# Patient Record
Sex: MALE | Race: BLACK OR AFRICAN AMERICAN | NOT HISPANIC OR LATINO
[De-identification: names, ages, dates, MRNs, and addresses within clinical notes are randomized per-mention and may not be internally consistent; named-entity substitution may affect disease eponyms.]

---

## 2018-02-18 PROBLEM — Z00.00 ENCOUNTER FOR PREVENTIVE HEALTH EXAMINATION: Status: ACTIVE | Noted: 2018-02-18

## 2018-03-19 ENCOUNTER — APPOINTMENT (OUTPATIENT)
Dept: CHRONIC DISEASE MANAGEMENT | Facility: CLINIC | Age: 63
End: 2018-03-19
Payer: COMMERCIAL

## 2018-03-19 VITALS — BODY MASS INDEX: 26.28 KG/M2 | HEIGHT: 72 IN | WEIGHT: 194 LBS

## 2018-03-19 PROCEDURE — 96150: CPT

## 2018-05-19 ENCOUNTER — EMERGENCY (EMERGENCY)
Facility: HOSPITAL | Age: 63
LOS: 1 days | Discharge: HOME | End: 2018-05-19
Attending: EMERGENCY MEDICINE | Admitting: EMERGENCY MEDICINE
Payer: COMMERCIAL

## 2018-05-19 VITALS
DIASTOLIC BLOOD PRESSURE: 85 MMHG | SYSTOLIC BLOOD PRESSURE: 146 MMHG | RESPIRATION RATE: 16 BRPM | HEART RATE: 58 BPM | OXYGEN SATURATION: 100 %

## 2018-05-19 LAB
ALBUMIN SERPL ELPH-MCNC: 4.4 G/DL — SIGNIFICANT CHANGE UP (ref 3.3–5)
ALP SERPL-CCNC: 96 U/L — SIGNIFICANT CHANGE UP (ref 40–120)
ALT FLD-CCNC: 23 U/L — SIGNIFICANT CHANGE UP (ref 4–41)
AST SERPL-CCNC: 22 U/L — SIGNIFICANT CHANGE UP (ref 4–40)
BASOPHILS # BLD AUTO: 0.01 K/UL — SIGNIFICANT CHANGE UP (ref 0–0.2)
BASOPHILS NFR BLD AUTO: 0.2 % — SIGNIFICANT CHANGE UP (ref 0–2)
BILIRUB SERPL-MCNC: 0.4 MG/DL — SIGNIFICANT CHANGE UP (ref 0.2–1.2)
BUN SERPL-MCNC: 17 MG/DL — SIGNIFICANT CHANGE UP (ref 7–23)
CALCIUM SERPL-MCNC: 9.7 MG/DL — SIGNIFICANT CHANGE UP (ref 8.4–10.5)
CHLORIDE SERPL-SCNC: 102 MMOL/L — SIGNIFICANT CHANGE UP (ref 98–107)
CO2 SERPL-SCNC: 28 MMOL/L — SIGNIFICANT CHANGE UP (ref 22–31)
CREAT SERPL-MCNC: 0.93 MG/DL — SIGNIFICANT CHANGE UP (ref 0.5–1.3)
EOSINOPHIL # BLD AUTO: 0.09 K/UL — SIGNIFICANT CHANGE UP (ref 0–0.5)
EOSINOPHIL NFR BLD AUTO: 2.1 % — SIGNIFICANT CHANGE UP (ref 0–6)
GLUCOSE SERPL-MCNC: 156 MG/DL — HIGH (ref 70–99)
HCT VFR BLD CALC: 38.2 % — LOW (ref 39–50)
HGB BLD-MCNC: 13.4 G/DL — SIGNIFICANT CHANGE UP (ref 13–17)
IMM GRANULOCYTES # BLD AUTO: 0.01 # — SIGNIFICANT CHANGE UP
IMM GRANULOCYTES NFR BLD AUTO: 0.2 % — SIGNIFICANT CHANGE UP (ref 0–1.5)
LYMPHOCYTES # BLD AUTO: 1.76 K/UL — SIGNIFICANT CHANGE UP (ref 1–3.3)
LYMPHOCYTES # BLD AUTO: 41.9 % — SIGNIFICANT CHANGE UP (ref 13–44)
MCHC RBC-ENTMCNC: 31.9 PG — SIGNIFICANT CHANGE UP (ref 27–34)
MCHC RBC-ENTMCNC: 35.1 % — SIGNIFICANT CHANGE UP (ref 32–36)
MCV RBC AUTO: 91 FL — SIGNIFICANT CHANGE UP (ref 80–100)
MONOCYTES # BLD AUTO: 0.31 K/UL — SIGNIFICANT CHANGE UP (ref 0–0.9)
MONOCYTES NFR BLD AUTO: 7.4 % — SIGNIFICANT CHANGE UP (ref 2–14)
NEUTROPHILS # BLD AUTO: 2.02 K/UL — SIGNIFICANT CHANGE UP (ref 1.8–7.4)
NEUTROPHILS NFR BLD AUTO: 48.2 % — SIGNIFICANT CHANGE UP (ref 43–77)
NRBC # FLD: 0 — SIGNIFICANT CHANGE UP
PLATELET # BLD AUTO: 152 K/UL — SIGNIFICANT CHANGE UP (ref 150–400)
PMV BLD: 11.1 FL — SIGNIFICANT CHANGE UP (ref 7–13)
POTASSIUM SERPL-MCNC: 4.5 MMOL/L — SIGNIFICANT CHANGE UP (ref 3.5–5.3)
POTASSIUM SERPL-SCNC: 4.5 MMOL/L — SIGNIFICANT CHANGE UP (ref 3.5–5.3)
PROT SERPL-MCNC: 7.2 G/DL — SIGNIFICANT CHANGE UP (ref 6–8.3)
RBC # BLD: 4.2 M/UL — SIGNIFICANT CHANGE UP (ref 4.2–5.8)
RBC # FLD: 13.1 % — SIGNIFICANT CHANGE UP (ref 10.3–14.5)
SODIUM SERPL-SCNC: 140 MMOL/L — SIGNIFICANT CHANGE UP (ref 135–145)
TROPONIN T SERPL-MCNC: < 0.06 NG/ML — SIGNIFICANT CHANGE UP (ref 0–0.06)
WBC # BLD: 4.2 K/UL — SIGNIFICANT CHANGE UP (ref 3.8–10.5)
WBC # FLD AUTO: 4.2 K/UL — SIGNIFICANT CHANGE UP (ref 3.8–10.5)

## 2018-05-19 PROCEDURE — 99285 EMERGENCY DEPT VISIT HI MDM: CPT | Mod: 25

## 2018-05-19 PROCEDURE — 70450 CT HEAD/BRAIN W/O DYE: CPT | Mod: 26

## 2018-05-19 RX ORDER — MECLIZINE HCL 12.5 MG
1 TABLET ORAL
Qty: 14 | Refills: 0 | OUTPATIENT
Start: 2018-05-19 | End: 2018-05-25

## 2018-05-19 RX ORDER — MECLIZINE HCL 12.5 MG
25 TABLET ORAL ONCE
Qty: 0 | Refills: 0 | Status: COMPLETED | OUTPATIENT
Start: 2018-05-19 | End: 2018-05-19

## 2018-05-19 RX ADMIN — Medication 25 MILLIGRAM(S): at 07:50

## 2018-05-19 NOTE — ED ADULT NURSE NOTE - OBJECTIVE STATEMENT
64 yo F received in spot B.  Pt is 64 yo M A&Ox4 co dizziness.  Pt rpts. the room is spinning. Pt. denies any hx. of Vertigo.  Rpts he took his 1st metformin yesterday for the first time.  Pt NAD.  18R ac

## 2018-05-19 NOTE — CONSULT NOTE ADULT - ASSESSMENT
Pt is a 62 yo M with a PMH of HTN and DM who is presenting with new onset room spinning sensation on getting out of bed this AM. By description dizziness is vertiginous and peripheral. On exam there is torsional nystagmus to right head turn on [place]-Hallpike as well as quick saccades on head thrust. Symptoms slightly improved with Epley. Head CT (prelim as per me) shows no acute pathology. Likely dizziness 2/2 Benign Paroxysmal Positional Vertigo.    Recommendations:  - c/w Meclizine 25mg po q8hr standing for 3-4 days  - If official CT head shows no acute pathology can f/u ENT and Neuro as outpatient  - Will need ENT to specify canal of cause and direct vestibular therapy  - Can call [phone] for Neuro appointment with Dr. Schoenberg

## 2018-05-19 NOTE — ED PROVIDER NOTE - ATTENDING CONTRIBUTION TO CARE
Toritolianetwitelma: 64 yo male with a h/o DM c/o room spinning dizziness that began yesterday. + associated chest pain which is chronic and has been worked up as an outpatient. NO SOB, headache or visual changes. Dizziness is worse with positional changes. NO nausea or vomiting. Exam: well appearing, NAD, MMM, +S1/S2, no murmurs, + bradycardia, lungs CTA b/l, abdomen is soft and tender, no LE edema or calf TTP. 2+ distal pulses x 4 extremities. CNI-XII intact, no dysmetria, disdiadochokinesia, or ataxia. AxOx4. A/P- 64 yo male with first episode of vertigo like symptoms will obtain cbc, cmp, troponin, CT head, treat symptomatically and consult neuro.

## 2018-05-19 NOTE — ED PROVIDER NOTE - CARE PLAN
Principal Discharge DX:	Vertigo  Assessment and plan of treatment:	1) Please follow-up with an ENT doctor (referral list attached) within the next week if your symptoms persist.  If you cannot follow-up, please return to the ED for any urgent issues.  2) If you have any worsening of symptoms, including intractable nausea or vomiting, worsening dizziness, inability to walk, or any other concerns please return to the ED immediately.  3) Please continue taking your home medications as directed.  4) You may have been given a copy of your labs and/or imaging.  Please go over these with your primary care doctor.  5) A prescription has been sent to your pharmacy. Please take it as directed.

## 2018-05-19 NOTE — ED PROVIDER NOTE - PLAN OF CARE
1) Please follow-up with an ENT doctor (referral list attached) within the next week if your symptoms persist.  If you cannot follow-up, please return to the ED for any urgent issues.  2) If you have any worsening of symptoms, including intractable nausea or vomiting, worsening dizziness, inability to walk, or any other concerns please return to the ED immediately.  3) Please continue taking your home medications as directed.  4) You may have been given a copy of your labs and/or imaging.  Please go over these with your primary care doctor.  5) A prescription has been sent to your pharmacy. Please take it as directed.

## 2018-05-19 NOTE — ED PROVIDER NOTE - PROGRESS NOTE DETAILS
Nito: Pt feeling much better after meclizine. troponin still pending. Sanchez: Patient reports feeling much improved, asymptomatic at this time. Pt seen and eval by neuro.  Test results and dispo plan reviewed.

## 2018-05-19 NOTE — ED PROVIDER NOTE - MEDICAL DECISION MAKING DETAILS
63M pmhx dm, htn, c/o one episode room-spinning dizziness w/ preceding cp. PE WNL, will r/o cardiac + obtain neuro consult.

## 2018-05-19 NOTE — CONSULT NOTE ADULT - SUBJECTIVE AND OBJECTIVE BOX
HPI:  Pt is a 64 yo M with a PMH of HTN and DM who is presenting with new onset room spinning sensation on getting out of bed this AM. Pt notes that it is worse on turning his head to the right, lasts seconds to a minute then resolves, but returns on moving his head again. Improved by closing his eyes, denies any prior events like this. Pt does also note some CP and SOB he has been having for a couple of months that is being worked up his cardiologist. Otherwise pt denies any recent fevers, chills, HA, weakness, numbness, tingling, vision changes, hearing changes, cough, nausea, vomiting, abdominal pain, changes in BMs/urination.      MEDICATIONS  (STANDING):    MEDICATIONS  (PRN):    PAST MEDICAL & SURGICAL HISTORY:    FAMILY HISTORY:    Allergies    No Known Allergies    Intolerances    SHx - No smoking, No ETOH, No drug abuse    Review of Systems:  See HPI.    Vital Signs Last 24 Hrs  T(C): --  T(F): --  HR: 58 (19 May 2018 05:33) (58 - 58)  BP: 146/85 (19 May 2018 05:33) (146/85 - 146/85)  BP(mean): --  RR: 16 (19 May 2018 05:33) (16 - 16)  SpO2: 100% (19 May 2018 05:33) (100% - 100%)      General Exam:   General appearance: No acute distress    Neurological Exam:  Mental Status: Orientated to self, date and place.  Attention intact.  No dysarthria. Speech fluent.  Cranial Nerves: PERRL, EOMI, VFF, [place]-Hallpike with torsional nystagmus to right head turn, quick saccades on head thrust. CN V1-3 intact to light touch b/l.  No facial asymmetry.  Hearing intact to finger rub bilaterally.  Tongue, uvula and palate midline.  Sternocleidomastoid and Trapezius intact bilaterally.    Motor:   Tone: normal.                  Strength:     [] Upper extremity                      Delt       Bicep    Tricep                                                  R         5/5        5/5        5/5       5/5                                               L          5/5        5/5        5/5       5/5  [] Lower extremity                       HF          KE          KF        DF         PF                                               R        5/5        5/5        5/5       5/5       5/5                                               L         5/5        5/5       5/5       5/5        5/5  Pronator drift: none b/l  Dysmetria: None to finger-nose-fingerb/l  No truncal ataxia.    Tremor: No resting, postural or action tremor.  No myoclonus.    Sensation: intact to light touch throughout, no extinction    Deep Tendon Reflexes:              Biceps     BR        Patellar        Ankle       Babinski                                         R       1+          1+        1+               0             downgoing                                         L        1+          1+        1+               0             downgoing    Gait: normal in all modalities      05-19    140  |  102  |  17  ----------------------------<  156<H>  4.5   |  28  |  0.93    Ca    9.7      19 May 2018 07:09    TPro  7.2  /  Alb  4.4  /  TBili  0.4  /  DBili  x   /  AST  22  /  ALT  23  /  AlkPhos  96  05-19                            13.4   4.20  )-----------( 152      ( 19 May 2018 07:09 )             38.2

## 2018-05-19 NOTE — ED PROVIDER NOTE - OBJECTIVE STATEMENT
63M pmhx dm, htn, p/w one episode of dizziness (like room was spinning) this AM ~4am when he got up from bed to use the bathroom. Per pt this was first time this has ever happened to him. He did not fall over from the symptoms, nor did he have nausea or vomit from them. Of note pt did notice a sensation of left-sided chest "tightness" before he went to bed assoc w/ mild sob. No recent illness/surgery/fever/chills/ab pain.

## 2018-05-19 NOTE — ED PROVIDER NOTE - SHIFT CHANGE DETAILS
I have signed over this patient to the above attending physician. Pertinent history, physical exam findings and workup thus far in the ED have been discussed. The pending tests and plan, including CT head and neuro evaluation were signed over.  All questions from the above attending physician have been answered. I have signed over this patient to the above attending physician. Pertinent history, physical exam findings and workup thus far in the ED have been discussed. The pending tests and plan, including CT head, troponin, and neuro evaluation were signed over.  All questions from the above attending physician have been answered.

## 2018-05-19 NOTE — ED ADULT NURSE NOTE - CHIEF COMPLAINT QUOTE
Pt. w/ hx. DM c/o dizziness describes it like the room is spinning. Pt. denies any hx. of Vertigo, N/V. pt. states dizziness subsides when staying still.  Pt. states he is non-compliant with DM meds , took his 1st metformin yesterday for the first time. Pt. is afraid dizziness is due to the metformin. Pt. in NAD in triage.

## 2018-05-19 NOTE — ED ADULT TRIAGE NOTE - CHIEF COMPLAINT QUOTE
Pt. w/ hx. DM c/o dizziness describes it like the room is spinning. Pt. denies any hx. of Vertigo, N/V. Pt. states he is non-compliant with DM meds , took his 1st metformin yesterday for the first time. Pt. is afraid dizziness is due to the metformin. Pt. in NAD in triage. Pt. w/ hx. DM c/o dizziness describes it like the room is spinning. Pt. denies any hx. of Vertigo, N/V. pt. states dizziness subsides when staying still.  Pt. states he is non-compliant with DM meds , took his 1st metformin yesterday for the first time. Pt. is afraid dizziness is due to the metformin. Pt. in NAD in triage.

## 2018-05-19 NOTE — ED ADULT NURSE NOTE - EXTENSIONS OF SELF_ADULT
stated Ht (per granddaughter)-RN made aware: 70", wt (4/11) 64kg, IBW: 72.7kg %IBW:  88% BMI: 20.8; ABW utilized for nutritional needs as pt within % of IBW; Needs adjusted per replenishment in elderly 2/2 multiple PUs and unintentional wt loss None

## 2018-05-19 NOTE — ED ADULT NURSE REASSESSMENT NOTE - NS ED NURSE REASSESS COMMENT FT1
patient resting at this time denies dizziness HA or nausea at rest. Orthostatics completed and recorded, Dr. Beauchamp made aware of results.

## 2018-06-11 ENCOUNTER — APPOINTMENT (OUTPATIENT)
Dept: CHRONIC DISEASE MANAGEMENT | Facility: CLINIC | Age: 63
End: 2018-06-11
Payer: COMMERCIAL

## 2018-06-11 VITALS — WEIGHT: 193 LBS | BODY MASS INDEX: 26.18 KG/M2

## 2018-06-11 PROCEDURE — G0108 DIAB MANAGE TRN  PER INDIV: CPT

## 2018-08-06 ENCOUNTER — APPOINTMENT (OUTPATIENT)
Dept: CHRONIC DISEASE MANAGEMENT | Facility: CLINIC | Age: 63
End: 2018-08-06

## 2021-10-02 ENCOUNTER — EMERGENCY (EMERGENCY)
Facility: HOSPITAL | Age: 66
LOS: 1 days | Discharge: HOME | End: 2021-10-02
Attending: EMERGENCY MEDICINE
Payer: COMMERCIAL

## 2021-10-02 VITALS
HEIGHT: 72 IN | DIASTOLIC BLOOD PRESSURE: 97 MMHG | SYSTOLIC BLOOD PRESSURE: 176 MMHG | TEMPERATURE: 100 F | HEART RATE: 88 BPM | RESPIRATION RATE: 18 BRPM | OXYGEN SATURATION: 98 % | WEIGHT: 179.9 LBS

## 2021-10-02 LAB
ALBUMIN SERPL ELPH-MCNC: 4.5 G/DL — SIGNIFICANT CHANGE UP (ref 3.3–5)
ALP SERPL-CCNC: 79 U/L — SIGNIFICANT CHANGE UP (ref 40–120)
ALT FLD-CCNC: 14 U/L — SIGNIFICANT CHANGE UP (ref 10–45)
ANION GAP SERPL CALC-SCNC: 11 MMOL/L — SIGNIFICANT CHANGE UP (ref 5–17)
APTT BLD: 26.8 SEC — LOW (ref 27.5–35.5)
AST SERPL-CCNC: 15 U/L — SIGNIFICANT CHANGE UP (ref 10–40)
BASOPHILS # BLD AUTO: 0.07 K/UL — SIGNIFICANT CHANGE UP (ref 0–0.2)
BASOPHILS NFR BLD AUTO: 0.9 % — SIGNIFICANT CHANGE UP (ref 0–2)
BILIRUB SERPL-MCNC: 0.5 MG/DL — SIGNIFICANT CHANGE UP (ref 0.2–1.2)
BUN SERPL-MCNC: 12 MG/DL — SIGNIFICANT CHANGE UP (ref 7–23)
CALCIUM SERPL-MCNC: 9.6 MG/DL — SIGNIFICANT CHANGE UP (ref 8.4–10.5)
CHLORIDE SERPL-SCNC: 104 MMOL/L — SIGNIFICANT CHANGE UP (ref 96–108)
CO2 SERPL-SCNC: 25 MMOL/L — SIGNIFICANT CHANGE UP (ref 22–31)
CREAT SERPL-MCNC: 0.71 MG/DL — SIGNIFICANT CHANGE UP (ref 0.5–1.3)
D DIMER BLD IA.RAPID-MCNC: 225 NG/ML DDU — SIGNIFICANT CHANGE UP
EOSINOPHIL # BLD AUTO: 0.07 K/UL — SIGNIFICANT CHANGE UP (ref 0–0.5)
EOSINOPHIL NFR BLD AUTO: 0.9 % — SIGNIFICANT CHANGE UP (ref 0–6)
GLUCOSE SERPL-MCNC: 133 MG/DL — HIGH (ref 70–99)
HCT VFR BLD CALC: 37.1 % — LOW (ref 39–50)
HGB BLD-MCNC: 12.3 G/DL — LOW (ref 13–17)
INR BLD: 1.06 RATIO — SIGNIFICANT CHANGE UP (ref 0.88–1.16)
LYMPHOCYTES # BLD AUTO: 2.53 K/UL — SIGNIFICANT CHANGE UP (ref 1–3.3)
LYMPHOCYTES # BLD AUTO: 33.9 % — SIGNIFICANT CHANGE UP (ref 13–44)
MANUAL SMEAR VERIFICATION: SIGNIFICANT CHANGE UP
MCHC RBC-ENTMCNC: 30.9 PG — SIGNIFICANT CHANGE UP (ref 27–34)
MCHC RBC-ENTMCNC: 33.2 GM/DL — SIGNIFICANT CHANGE UP (ref 32–36)
MCV RBC AUTO: 93.2 FL — SIGNIFICANT CHANGE UP (ref 80–100)
MONOCYTES # BLD AUTO: 0.78 K/UL — SIGNIFICANT CHANGE UP (ref 0–0.9)
MONOCYTES NFR BLD AUTO: 10.4 % — SIGNIFICANT CHANGE UP (ref 2–14)
NEUTROPHILS # BLD AUTO: 3.76 K/UL — SIGNIFICANT CHANGE UP (ref 1.8–7.4)
NEUTROPHILS NFR BLD AUTO: 50.4 % — SIGNIFICANT CHANGE UP (ref 43–77)
PLAT MORPH BLD: NORMAL — SIGNIFICANT CHANGE UP
PLATELET # BLD AUTO: 155 K/UL — SIGNIFICANT CHANGE UP (ref 150–400)
POTASSIUM SERPL-MCNC: 3.9 MMOL/L — SIGNIFICANT CHANGE UP (ref 3.5–5.3)
POTASSIUM SERPL-SCNC: 3.9 MMOL/L — SIGNIFICANT CHANGE UP (ref 3.5–5.3)
PROT SERPL-MCNC: 7.3 G/DL — SIGNIFICANT CHANGE UP (ref 6–8.3)
PROTHROM AB SERPL-ACNC: 12.7 SEC — SIGNIFICANT CHANGE UP (ref 10.6–13.6)
RBC # BLD: 3.98 M/UL — LOW (ref 4.2–5.8)
RBC # FLD: 13 % — SIGNIFICANT CHANGE UP (ref 10.3–14.5)
RBC BLD AUTO: SIGNIFICANT CHANGE UP
SODIUM SERPL-SCNC: 140 MMOL/L — SIGNIFICANT CHANGE UP (ref 135–145)
VARIANT LYMPHS # BLD: 3.5 % — SIGNIFICANT CHANGE UP (ref 0–6)
WBC # BLD: 7.47 K/UL — SIGNIFICANT CHANGE UP (ref 3.8–10.5)
WBC # FLD AUTO: 7.47 K/UL — SIGNIFICANT CHANGE UP (ref 3.8–10.5)

## 2021-10-02 PROCEDURE — 85025 COMPLETE CBC W/AUTO DIFF WBC: CPT

## 2021-10-02 PROCEDURE — 85730 THROMBOPLASTIN TIME PARTIAL: CPT

## 2021-10-02 PROCEDURE — 36415 COLL VENOUS BLD VENIPUNCTURE: CPT

## 2021-10-02 PROCEDURE — 99285 EMERGENCY DEPT VISIT HI MDM: CPT

## 2021-10-02 PROCEDURE — 80053 COMPREHEN METABOLIC PANEL: CPT

## 2021-10-02 PROCEDURE — 85379 FIBRIN DEGRADATION QUANT: CPT

## 2021-10-02 PROCEDURE — 93005 ELECTROCARDIOGRAM TRACING: CPT

## 2021-10-02 PROCEDURE — 73502 X-RAY EXAM HIP UNI 2-3 VIEWS: CPT | Mod: 26,LT

## 2021-10-02 PROCEDURE — 85610 PROTHROMBIN TIME: CPT

## 2021-10-02 PROCEDURE — 99284 EMERGENCY DEPT VISIT MOD MDM: CPT | Mod: 25

## 2021-10-02 PROCEDURE — 73502 X-RAY EXAM HIP UNI 2-3 VIEWS: CPT

## 2021-10-02 RX ORDER — IBUPROFEN 200 MG
400 TABLET ORAL ONCE
Refills: 0 | Status: COMPLETED | OUTPATIENT
Start: 2021-10-02 | End: 2021-10-02

## 2021-10-02 RX ORDER — FAMOTIDINE 10 MG/ML
40 INJECTION INTRAVENOUS ONCE
Refills: 0 | Status: COMPLETED | OUTPATIENT
Start: 2021-10-02 | End: 2021-10-02

## 2021-10-02 RX ORDER — DEXAMETHASONE 0.5 MG/5ML
10 ELIXIR ORAL ONCE
Refills: 0 | Status: COMPLETED | OUTPATIENT
Start: 2021-10-02 | End: 2021-10-02

## 2021-10-02 RX ADMIN — Medication 10 MILLIGRAM(S): at 20:29

## 2021-10-02 RX ADMIN — FAMOTIDINE 40 MILLIGRAM(S): 10 INJECTION INTRAVENOUS at 20:30

## 2021-10-02 RX ADMIN — Medication 400 MILLIGRAM(S): at 20:30

## 2021-10-02 NOTE — ED PROVIDER NOTE - NS ED ROS FT
REVIEW OF SYSTEMS:  CONSTITUTIONAL: No weakness, fevers or chills  EYES/ENT: No visual changes;  No vertigo or throat pain   NECK: No pain or stiffness  RESPIRATORY: No cough, wheezing, hemoptysis; No shortness of breath  CARDIOVASCULAR: No chest pain or palpitations  GASTROINTESTINAL: No abdominal or epigastric pain. No nausea, vomiting, or hematemesis; No diarrhea or constipation. No melena or hematochezia.  GENITOURINARY: No dysuria, frequency or hematuria  MSK: +Left calf pain +L hip pain  NEUROLOGICAL: No numbness or weakness  ENDOCRINE: No polyuria, polydipsia, heat or cold intolerance  SKIN: No itching, rashes

## 2021-10-02 NOTE — ED PROVIDER NOTE - CLINICAL SUMMARY MEDICAL DECISION MAKING FREE TEXT BOX
66M presenting with a 2 day history of L calf pain possibly secondary to DVT. EKG, Labs with D-dimer, VA Duplex LLE to r/o DVT. 66M presenting with a 2 day history of L calf pain possibly secondary to DVT. EKG, Labs with D-dimer, VA Duplex LLE to r/o DVT. Xray L hip.

## 2021-10-02 NOTE — ED PROVIDER NOTE - OBJECTIVE STATEMENT
Patient is a 66M with PMH DM, HTN, HLD now presenting to the ED with a 2 day history of sudden onset left calf pain. He reports that 2 days ago he experienced sudden onset left calf cramping pain while at rest. Reports the pain has persisted despite trying to relieve the pain with ice and leg cramp cream. Also reports left hip pain which started 2 days ago. Reports pain with weight bearing on the left leg. Reports road trip to [place] in August. Reports chronic left ankle pain. Denies leg swelling or erythema. Denies history of clots. Denies fever, chills, chest pain, shortness of breath, pleuritic chest pain, palpitations, abdominal pain, n/v, or diarrhea.

## 2021-10-02 NOTE — ED PROVIDER NOTE - PHYSICAL EXAMINATION
T(C): 37.7 (10-02-21 @ 15:58), Max: 37.7 (10-02-21 @ 15:58)  HR: 88 (10-02-21 @ 15:58) (88 - 88)  BP: 176/97 (10-02-21 @ 15:58) (176/97 - 176/97)  RR: 18 (10-02-21 @ 15:58) (18 - 18)  SpO2: 98% (10-02-21 @ 15:58) (98% - 98%)    PHYSICAL EXAM:  GENERAL: NAD, resting in bed  HEAD:  Atraumatic, Normocephalic  EYES: EOMI, PERRLA, conjunctiva and sclera clear  ENMT: Dry mucous membranes  NECK: Supple, No JVD  CHEST/LUNG: Clear to auscultation bilaterally; No rales, rhonchi, wheezing  HEART: Regular rate and rhythm; No murmurs, rubs, or gallops  ABDOMEN: Soft, Nontender, Nondistended; Bowel sounds present  EXTREMITIES:  2+ Peripheral Pulses, No clubbing, cyanosis, or edema  MSK: negative straight leg test. +left calf pain elicited with left ankle extension. No erythema, edema or warmth in b/l LE  LYMPH: No lymphadenopathy noted  SKIN: No rashes or lesions  NERVOUS SYSTEM:  Alert & Oriented X3, No focal deficits.

## 2021-10-02 NOTE — ED PROVIDER NOTE - ATTENDING CONTRIBUTION TO CARE
Pt with L calf pain, mild td distal calf, no achilles td, nv intact, also with L hip pain for few days, no back pain, +td soft tissue L lateral hip, no trauma.

## 2021-10-02 NOTE — ED ADULT NURSE NOTE - OBJECTIVE STATEMENT
66 yr old male to the ED via wheelchair with c/o RLE pain since Thursday night Seen at Urgent care today and refer to ED. Denies sob C/o chest pain that has been ongoing EKG done had nuclear stress test x 1 year ago Neg results. Denies recent travel denies cig smoking. Denies fever or chills C/o behind the calf pain rad up to buttock. Denies trauma Has HX HTN Type 2 diabetes. wife at bedside. HAD [employer] Covid vaccine. About one week ago pt moved a refrigerator and experienced back pain that was resolved prior to LLE pain No redness warmth or swelling noted.

## 2021-10-02 NOTE — ED PROVIDER NOTE - NSFOLLOWUPINSTRUCTIONS_ED_ALL_ED_FT
You were seen and evaluated in the Emergency Department for your leg pain symptoms. You were evaluated clinically and with laboratory studies.    At this time your clinical evaluation and history do not demonstrate any acute, life-threatening medical conditions warranting emergent treatment. However, we strongly recommend you follow up with one of our Primary Care Doctors (or your own) for further evaluation of your symptoms by calling the following number to make an appointment:    [facility]  General Internal Medicine  [address] [place] [address]  Phone: [phone]     Should you develop new or worsening chest pain, shortness of breath, abdominal pain, fevers, chills, nausea, vomiting, diarrhea, or constipation - please return to the ED for immediate evaluation.     We also strongly encourage you make an appointment with your Primary Care Physician for a comprehensive evaluation of your health.

## 2021-10-02 NOTE — ED PROVIDER NOTE - PROGRESS NOTE DETAILS
Resident Yadira: pt re-evaluated clinically, comfortable on re-exam. Preliminary evaluation without any acute, actionable pathology. Pt is tolerating PO intake, without any nausea/vomiting, with stable gait - will discharge home with followup care.

## 2021-10-02 NOTE — ED PROVIDER NOTE - PATIENT PORTAL LINK FT
You can access the FollowMyHealth Patient Portal offered by [facility] by registering at the following website: [email or web address]. By joining [employer]’s FollowMyHealth portal, you will also be able to view your health information using other applications (apps) compatible with our system.

## 2021-10-02 NOTE — ED ADULT TRIAGE NOTE - WEIGHT IN KG
Goal blood sugars first thing in the morning are .    No medication dose changes.    Do not take double doses of insulin.   
81.6

## 2022-01-19 ENCOUNTER — EMERGENCY (EMERGENCY)
Facility: HOSPITAL | Age: 67
LOS: 1 days | Discharge: HOME | End: 2022-01-19
Attending: EMERGENCY MEDICINE
Payer: COMMERCIAL

## 2022-01-19 VITALS
TEMPERATURE: 98 F | HEART RATE: 57 BPM | RESPIRATION RATE: 20 BRPM | SYSTOLIC BLOOD PRESSURE: 191 MMHG | DIASTOLIC BLOOD PRESSURE: 88 MMHG | HEIGHT: 72 IN | WEIGHT: 184.97 LBS | OXYGEN SATURATION: 99 %

## 2022-01-19 PROBLEM — E78.5 HYPERLIPIDEMIA, UNSPECIFIED: Chronic | Status: ACTIVE | Noted: 2021-10-02

## 2022-01-19 PROBLEM — E11.9 TYPE 2 DIABETES MELLITUS WITHOUT COMPLICATIONS: Chronic | Status: ACTIVE | Noted: 2021-10-02

## 2022-01-19 PROBLEM — I10 ESSENTIAL (PRIMARY) HYPERTENSION: Chronic | Status: ACTIVE | Noted: 2021-10-02

## 2022-01-19 PROCEDURE — 99283 EMERGENCY DEPT VISIT LOW MDM: CPT

## 2022-01-19 RX ORDER — LIDOCAINE 4 G/100G
1 CREAM TOPICAL ONCE
Refills: 0 | Status: COMPLETED | OUTPATIENT
Start: 2022-01-19 | End: 2022-01-19

## 2022-01-19 RX ORDER — ACETAMINOPHEN 500 MG
975 TABLET ORAL ONCE
Refills: 0 | Status: COMPLETED | OUTPATIENT
Start: 2022-01-19 | End: 2022-01-19

## 2022-01-19 RX ORDER — CYCLOBENZAPRINE HYDROCHLORIDE 10 MG/1
10 TABLET, FILM COATED ORAL ONCE
Refills: 0 | Status: COMPLETED | OUTPATIENT
Start: 2022-01-19 | End: 2022-01-19

## 2022-01-19 RX ADMIN — Medication 975 MILLIGRAM(S): at 03:01

## 2022-01-19 RX ADMIN — CYCLOBENZAPRINE HYDROCHLORIDE 10 MILLIGRAM(S): 10 TABLET, FILM COATED ORAL at 03:01

## 2022-01-19 RX ADMIN — LIDOCAINE 1 PATCH: 4 CREAM TOPICAL at 03:01

## 2022-01-19 NOTE — ED PROVIDER NOTE - CLINICAL SUMMARY MEDICAL DECISION MAKING FREE TEXT BOX
67 year old male with history of HTN, HLD, NIDDM2, presenting with R neck pain and HTN. Hypertensive to 191 SBP but vitals otherwise wnl. Exam reassurring with reproducible muscular tenderness to R sternocleidomastoid. Very likely muscular strain vs spasm. Will treat symptomatically and repeat BP measurement, elevated BP likely 2/2 pain/anxiety. Anticipate dc with pmd f/u. 67 year old male with history of HTN, HLD, NIDDM2, presenting with R neck pain and HTN. Hypertensive to 191 SBP but vitals otherwise wnl. Exam reassurring with reproducible muscular tenderness to R sternocleidomastoid. Very likely muscular strain vs spasm. Will treat symptomatically and repeat BP measurement, elevated BP likely 2/2 pain/anxiety. Anticipate dc with pmd f/u.    Dr. Lam's Note: Multiple diagnoses were considered during patient's evaluation today. Very low concern for atypical ACS, vascular pathology, trauma, cervical spine condition. very consistent w msk pian due to location, reproducibility, lack of concerning associated sx. pt improved w meds. there appears to be no emergent process today that would require further emergent or inpatient management. Pt is safe for dc with outpt f/u and return instructions if symptoms worsen.

## 2022-01-19 NOTE — ED ADULT NURSE NOTE - OBJECTIVE STATEMENT
66 y/o male presenting to the ER c/o neck pain. Pt reports 1 day of L. sided neck pain which has radiated to the R. side. Pt reports to [facility] A&Ox3, ambulatory from triage, endorsing neck pain x 1 day which has initially started on the L. side and radiated to the R. side, pain is aggravated by movement, pt endorses neck stiffness as well as restricted neck movement to R. side. PMHx HTN, HLD, DM. Pt denies chest pain, SOB/difficulty breathing, fever/chills, HA, abd pain, N/V/D, lightheadedness/dizziness, numbness/tingling. Pt A&Ox3, breathing spontaneous and unlabored, bed locked and in lowest position.

## 2022-01-19 NOTE — ED PROVIDER NOTE - PHYSICAL EXAMINATION
Gen - NAD; well-appearing; A+Ox3   HEENT - NCAT, EOMI, moist mucous membranes  Neck - supple, +point tenderness R SCM, pain elicited with R neck rotation, no nuchal rigidity  Resp - CTAB, no increased WOB  CV -  RRR, no m/r/g  Abd - soft, NT, ND; no guarding or rebound  MSK - 5/5 strength and FROM b/l UE and LE  Extrem - no LE edema/erythema/tenderness  Neuro - no focal motor or sensation deficits

## 2022-01-19 NOTE — ED PROVIDER NOTE - NSFOLLOWUPINSTRUCTIONS_ED_ALL_ED_FT
You were seen in the Emergency Department for:    You were prescribed to the pharmacy:  Please follow the instructions on the container/label and ask your pharmacist for any questions/concerns.    For pain/fever, you may take Tylenol (acetaminophen) 650 mg every 6 hours, OR Advil (ibuprofen) 600 mg every 8 hours.    Please follow up with your primary physician as discussed. If you do not have a primary physician or specialist of your needs, please call [phone] to find one convenient for you. At this number you will be able to locate a provider who accepts your insurance, as well as locate the right specialist for your needs.    You should return to the Emergency Department if you feel any new/worsening/persistent symptoms including but not limited to: chest pain, difficulty breathing, loss of consciousness, bleeding, uncontrolled pain, numbness/weakness of a body part You were seen in the Emergency Department for: neck pain    For pain/fever, you may take Tylenol (acetaminophen) 650 mg every 6 hours, AND/OR Advil (ibuprofen) 600 mg every 8 hours.    Please follow up with your primary physician as discussed. If you do not have a primary physician or specialist of your needs, please call [phone] to find one convenient for you. At this number you will be able to locate a provider who accepts your insurance, as well as locate the right specialist for your needs.    You should return to the Emergency Department if you feel any new/worsening/persistent symptoms including but not limited to: chest pain, difficulty breathing, loss of consciousness, bleeding, uncontrolled pain, numbness/weakness of a body part

## 2022-01-19 NOTE — ED PROVIDER NOTE - PATIENT PORTAL LINK FT
You can access the FollowMyHealth Patient Portal offered by [facility] by registering at the following website: [email or web address]. By joining [employer]’s FollowMyHealth portal, you will also be able to view your health information using other applications (apps) compatible with our system.

## 2022-01-19 NOTE — ED PROVIDER NOTE - PRO INTERPRETER NEED 2
[Normal] : supple, no neck mass and thyroid not enlarged [Normal Neck Lymph Nodes] : normal neck lymph nodes  [Normal Supraclavicular Lymph Nodes] : normal supraclavicular lymph nodes [Normal Groin Lymph Nodes] : normal groin lymph nodes [Normal Axillary Lymph Nodes] : normal axillary lymph nodes [Normal] : oriented to person, place and time, with appropriate affect [de-identified] : soft NT ND; incisions c/d/i English

## 2022-01-19 NOTE — ED PROVIDER NOTE - PROGRESS NOTE DETAILS
Bryan PGY-2: Patient reassessed, resting comfortably, states pain is significantly improved with meds. Repeat /87. Will dc.

## 2022-01-19 NOTE — ED PROVIDER NOTE - NSICDXPASTMEDICALHX_GEN_ALL_CORE_FT
PAST MEDICAL HISTORY:  Diabetes     HLD (hyperlipidemia)     HLD (hyperlipidemia)     HTN (hypertension)     HTN (hypertension)

## 2022-01-19 NOTE — ED ADULT TRIAGE NOTE - CHIEF COMPLAINT QUOTE
neck pain x1 day, atraumatic; was on the left earlier today, now pain on right; no headache/blurry vision

## 2022-01-19 NOTE — ED PROVIDER NOTE - OBJECTIVE STATEMENT
67 year old male with history of HTN, HLD, NIDDM2, presenting with R neck pain and HTN. States having L sided neck pain initially last night that now changed to R side, has no pain at rest but with movement, has some associated stiffness. Took BP measurement, was 160/99. Reports mild lightheadedness but denies chest pain, SOB, abdominal pain, N/V/D, diaphoresis, vision changes, jaw pain, fevers, chills. Took extra losartan and 2 baby aspirins PTA.

## 2023-04-19 PROBLEM — Z12.83 SCREENING EXAM FOR SKIN CANCER: Status: RESOLVED | Noted: 2023-04-19 | Resolved: 2023-04-29

## 2023-04-19 PROBLEM — D22.9 MULTIPLE BENIGN MELANOCYTIC NEVI: Status: ACTIVE | Noted: 2023-04-19

## 2023-04-19 PROBLEM — L81.4 LENTIGINES: Status: ACTIVE | Noted: 2023-04-19

## 2023-04-20 ENCOUNTER — APPOINTMENT (OUTPATIENT)
Dept: DERMATOLOGY | Facility: CLINIC | Age: 68
End: 2023-04-20
Payer: MEDICARE

## 2023-04-20 VITALS — WEIGHT: 185 LBS | BODY MASS INDEX: 25.06 KG/M2 | HEIGHT: 72 IN

## 2023-04-20 DIAGNOSIS — Z12.83 ENCOUNTER FOR SCREENING FOR MALIGNANT NEOPLASM OF SKIN: ICD-10-CM

## 2023-04-20 DIAGNOSIS — D22.9 MELANOCYTIC NEVI, UNSPECIFIED: ICD-10-CM

## 2023-04-20 DIAGNOSIS — L81.4 OTHER MELANIN HYPERPIGMENTATION: ICD-10-CM

## 2023-04-20 DIAGNOSIS — D48.5 NEOPLASM OF UNCERTAIN BEHAVIOR OF SKIN: ICD-10-CM

## 2023-04-20 PROCEDURE — 11102 TANGNTL BX SKIN SINGLE LES: CPT

## 2023-04-20 PROCEDURE — 99203 OFFICE O/P NEW LOW 30 MIN: CPT | Mod: 25

## 2023-04-20 PROCEDURE — 11103 TANGNTL BX SKIN EA SEP/ADDL: CPT

## 2023-04-20 NOTE — PHYSICAL EXAM
[FreeTextEntry3] : AAOx3, pleasant, NAD, no visual lymphadenopathy\par hair, scalp, face, nose, eyelids, ears, lips, oropharynx, neck, chest, abdomen, back, right arm, left arm, nails, and hands examined with all normal findings,\par pertinent findings include:\par \par multiple benign nevi and lentigines\par verrucous papule on left cheek and right neck

## 2023-04-20 NOTE — HISTORY OF PRESENT ILLNESS
[FreeTextEntry1] : skin check [de-identified] : 68 year old male here for skin check. growths on face. recurrent.

## 2023-04-20 NOTE — ASSESSMENT
[FreeTextEntry1] : 1) benign findings as above- education\par \par 2) Shave bx location left cheek, right neck\par diagnosis: r/o VV\par  \par Shave biopsy performed today over above location, risks and benefits discussed including incomplete removal, not enough tissue for diagnosis scarring and infection, informed consent obtained, pictures taken,  cleaned with alcohol and anesthetized with 1%lido+epi, 0.3 cc total, hemostasis obtained with cautery, vaseline and bandaid placed, tolerated well, wound care reviewed, specimen sent to pathology.\par \par

## 2023-05-01 LAB — DERMATOLOGY BIOPSY: NORMAL

## 2023-05-03 ENCOUNTER — NON-APPOINTMENT (OUTPATIENT)
Age: 68
End: 2023-05-03

## 2024-01-11 NOTE — ED ADULT NURSE NOTE - IN THE PAST 12 MONTHS HAVE YOU USED DRUGS OTHER THAN THOSE REQUIRED FOR MEDICAL REASON?
Patient of Dr. Hussein.   Attempted to contact patient on 1/11/2024.    Patient was seen for a formal transition to adult GI visit on 9/7/2022 with Dr. Renae Alvarez.     Patient was to make a FUV with adult GI and never did so.    Patient has continued to be seen by Peds GI by Dr. Hussein who is primary GI (9/27/22, 1/24/22 & 5/23/22).    Per Dr. Hussein, patient is appropriate to transition to adult GI.     This RN will review transition process with patient via phone and set patient up with adult GI. Additional transition visit not necessary at this time.    Spoke with Dr. Hussein regarding patient care plan.     Attempted to contact patient on 1/18/2024 to update patient. Unable to reach patient at this time. Voicemail left for patient. Patient instructed to return call to DEVAN Helton RN at [phone].    Patient sent discharge letter from Peds GI.  Patient is to transition to adult GI. Peds GI recommending care be followed by Jessica Devi NP based off patient address in chart.     Peds GI willing to provide alternative adult providers if needed and [facility] until patient is able to get established with Adult GI.      Dr. Hussein's nurse HAO Rosa made aware of patient care plan, as well as social work [place].    Patient sent [employer] invite via text and email 1/18/2024  
No

## 2024-02-14 NOTE — ED ADULT NURSE NOTE - IS THE PATIENT ABLE TO BE SCREENED?
"Palliative Supportive Care  met with patient/family to continue to provide emotional support and guidance.      Updated biopsychosocial information relevant to support:    The patient was identified by name and date of birth.  Dominick was informed that this is a telemedicine visit and that the visit is being conducted through the Epic Embedded platform. They agree to proceed..  My office door was closed. No one else was in the room. They acknowledged consent and understanding of privacy and security of the video platform. The patient has agreed to participate and understands they can discontinue the visit at any time.    Dominick spoke about feeling a little down this week. He said he believed that his ankle brace would mean he was going to be able to start doing a lot more. He said his foot swells up very easily and he cannot do much on it for long after mid afternoon. He said his PT explained this is normal and to be expected but he was upset. LCSW discussed talking with his providers about a realistic timeline of healing and what to expect as he graduates further so he may set more reasonable expectations. He said he does feel he needs to work on finding his balance and limits as well so he doesn't cause a set back being \"impatient\". Next meeting 2/20 at 2pm online.         Identified areas of need include: emotional support    Resources provided:    Areas that need future follow-up include: reduce anxiety/depressed mood, finding ways to feel productive and increase quality of life, accepting where he is at this time and creating reasonable expectations     I have spent 45 minutes with Patient  today in which greater than 50% of this time was spent in counseling/coordination of care     Palliative  will follow-up as requested by patient, family, and primary team.  Please contact with any specific requests      "
Yes

## 2024-07-27 ENCOUNTER — NON-APPOINTMENT (OUTPATIENT)
Age: 69
End: 2024-07-27

## 2024-09-13 ENCOUNTER — EMERGENCY (EMERGENCY)
Facility: HOSPITAL | Age: 69
LOS: 1 days | Discharge: HOME | End: 2024-09-13
Attending: STUDENT IN AN ORGANIZED HEALTH CARE EDUCATION/TRAINING PROGRAM
Payer: COMMERCIAL

## 2024-09-13 VITALS
SYSTOLIC BLOOD PRESSURE: 133 MMHG | DIASTOLIC BLOOD PRESSURE: 87 MMHG | OXYGEN SATURATION: 99 % | TEMPERATURE: 98 F | RESPIRATION RATE: 19 BRPM | HEART RATE: 78 BPM

## 2024-09-13 VITALS
OXYGEN SATURATION: 98 % | SYSTOLIC BLOOD PRESSURE: 174 MMHG | DIASTOLIC BLOOD PRESSURE: 80 MMHG | TEMPERATURE: 98 F | WEIGHT: 179.9 LBS | HEART RATE: 62 BPM | RESPIRATION RATE: 18 BRPM

## 2024-09-13 LAB
ALBUMIN SERPL ELPH-MCNC: 4.7 G/DL — SIGNIFICANT CHANGE UP (ref 3.3–5)
ALP SERPL-CCNC: 112 U/L — SIGNIFICANT CHANGE UP (ref 40–120)
ALT FLD-CCNC: 18 U/L — SIGNIFICANT CHANGE UP (ref 10–45)
ANION GAP SERPL CALC-SCNC: 14 MMOL/L — SIGNIFICANT CHANGE UP (ref 5–17)
AST SERPL-CCNC: 17 U/L — SIGNIFICANT CHANGE UP (ref 10–40)
BASOPHILS # BLD AUTO: 0.02 K/UL — SIGNIFICANT CHANGE UP (ref 0–0.2)
BASOPHILS NFR BLD AUTO: 0.5 % — SIGNIFICANT CHANGE UP (ref 0–2)
BILIRUB SERPL-MCNC: 0.4 MG/DL — SIGNIFICANT CHANGE UP (ref 0.2–1.2)
BUN SERPL-MCNC: 14 MG/DL — SIGNIFICANT CHANGE UP (ref 7–23)
CALCIUM SERPL-MCNC: 10 MG/DL — SIGNIFICANT CHANGE UP (ref 8.4–10.5)
CHLORIDE SERPL-SCNC: 104 MMOL/L — SIGNIFICANT CHANGE UP (ref 96–108)
CO2 SERPL-SCNC: 23 MMOL/L — SIGNIFICANT CHANGE UP (ref 22–31)
CREAT SERPL-MCNC: 0.79 MG/DL — SIGNIFICANT CHANGE UP (ref 0.5–1.3)
EGFR: 97 ML/MIN/1.73M2 — SIGNIFICANT CHANGE UP
EOSINOPHIL # BLD AUTO: 0.14 K/UL — SIGNIFICANT CHANGE UP (ref 0–0.5)
EOSINOPHIL NFR BLD AUTO: 3.2 % — SIGNIFICANT CHANGE UP (ref 0–6)
GLUCOSE SERPL-MCNC: 127 MG/DL — HIGH (ref 70–99)
HCT VFR BLD CALC: 38.1 % — LOW (ref 39–50)
HGB BLD-MCNC: 13.2 G/DL — SIGNIFICANT CHANGE UP (ref 13–17)
IMM GRANULOCYTES NFR BLD AUTO: 0.2 % — SIGNIFICANT CHANGE UP (ref 0–0.9)
LYMPHOCYTES # BLD AUTO: 2.31 K/UL — SIGNIFICANT CHANGE UP (ref 1–3.3)
LYMPHOCYTES # BLD AUTO: 53.3 % — HIGH (ref 13–44)
MCHC RBC-ENTMCNC: 32 PG — SIGNIFICANT CHANGE UP (ref 27–34)
MCHC RBC-ENTMCNC: 34.6 GM/DL — SIGNIFICANT CHANGE UP (ref 32–36)
MCV RBC AUTO: 92.5 FL — SIGNIFICANT CHANGE UP (ref 80–100)
MONOCYTES # BLD AUTO: 0.35 K/UL — SIGNIFICANT CHANGE UP (ref 0–0.9)
MONOCYTES NFR BLD AUTO: 8.1 % — SIGNIFICANT CHANGE UP (ref 2–14)
NEUTROPHILS # BLD AUTO: 1.5 K/UL — LOW (ref 1.8–7.4)
NEUTROPHILS NFR BLD AUTO: 34.7 % — LOW (ref 43–77)
NRBC # BLD: 0 /100 WBCS — SIGNIFICANT CHANGE UP (ref 0–0)
PLATELET # BLD AUTO: 195 K/UL — SIGNIFICANT CHANGE UP (ref 150–400)
POTASSIUM SERPL-MCNC: 3.8 MMOL/L — SIGNIFICANT CHANGE UP (ref 3.5–5.3)
POTASSIUM SERPL-SCNC: 3.8 MMOL/L — SIGNIFICANT CHANGE UP (ref 3.5–5.3)
PROT SERPL-MCNC: 7.8 G/DL — SIGNIFICANT CHANGE UP (ref 6–8.3)
RBC # BLD: 4.12 M/UL — LOW (ref 4.2–5.8)
RBC # FLD: 13.3 % — SIGNIFICANT CHANGE UP (ref 10.3–14.5)
SODIUM SERPL-SCNC: 141 MMOL/L — SIGNIFICANT CHANGE UP (ref 135–145)
WBC # BLD: 4.33 K/UL — SIGNIFICANT CHANGE UP (ref 3.8–10.5)
WBC # FLD AUTO: 4.33 K/UL — SIGNIFICANT CHANGE UP (ref 3.8–10.5)

## 2024-09-13 PROCEDURE — 80053 COMPREHEN METABOLIC PANEL: CPT

## 2024-09-13 PROCEDURE — 99283 EMERGENCY DEPT VISIT LOW MDM: CPT | Mod: 25

## 2024-09-13 PROCEDURE — 99284 EMERGENCY DEPT VISIT MOD MDM: CPT

## 2024-09-13 PROCEDURE — 36000 PLACE NEEDLE IN VEIN: CPT

## 2024-09-13 PROCEDURE — 85025 COMPLETE CBC W/AUTO DIFF WBC: CPT

## 2024-09-13 PROCEDURE — 93005 ELECTROCARDIOGRAM TRACING: CPT

## 2024-09-13 RX ORDER — SODIUM CHLORIDE 9 MG/ML
1000 INJECTION INTRAMUSCULAR; INTRAVENOUS; SUBCUTANEOUS ONCE
Refills: 0 | Status: COMPLETED | OUTPATIENT
Start: 2024-09-13 | End: 2024-09-13

## 2024-09-13 RX ADMIN — SODIUM CHLORIDE 1000 MILLILITER(S): 9 INJECTION INTRAMUSCULAR; INTRAVENOUS; SUBCUTANEOUS at 04:14

## 2024-09-13 NOTE — ED PROVIDER NOTE - ATTENDING CONTRIBUTION TO CARE
I, Robert Rodgers, performed a history and physical exam of the patient and discussed their management with the resident provider. I reviewed the resident provider's note and agree with the documented findings and plan of care. I was present and available for all procedures.    69M with past medical history hypertension, dyslipidemia, diabetes mellitus, presents to the emergency department with dizziness x 5d. He saw his primary medical doctor who prescribed meclizine 25mg which is improving symptoms. He still has a little gait instability when walking. He also reports he turned over in bed last night and felt a "shock" sensation lasting a few seconds on left side of head. No symptoms since that time. He has no vision changes, nausea, vomiting.    Well appearing and in NAD, head normal appearing atraumatic, trachea midline, no respiratory distress, lungs cta bilaterally, rrr no murmurs, soft NT ND abdomen, no visible extremity deformities, Alert and oriented, non focal neuro exam, skin warm and dry, normal affect and mood, no leg swelling, calf ttp or jvd    Dizziness resolved otherwise well-appearing reassuring vital signs and exam unremarkable will evaluate screening labs EKG unlikely ACS PE pneumothorax dissection AAA pneumonia disposition for likely discharge with outpatient follow-up discussed patient agreed with plan

## 2024-09-13 NOTE — ED ADULT NURSE NOTE - CHPI ED NUR SEVERITY2
MILD Muscle Hinge Flap Text: The defect edges were debeveled with a #15 scalpel blade.  Given the size, depth and location of the defect and the proximity to free margins a muscle hinge flap was deemed most appropriate.  Using a sterile surgical marker, an appropriate hinge flap was drawn incorporating the defect. The area thus outlined was incised with a #15 scalpel blade.  The skin margins were undermined to an appropriate distance in all directions utilizing iris scissors.

## 2024-09-13 NOTE — ED PROVIDER NOTE - PHYSICAL EXAMINATION
General: alert, oriented to person, time, place  Psych: mood appropriate  Head: normocephalic; atraumatic  Eyes: conjunctivae clear bilaterally, sclerae anicteric  ENT: TM intact bilaterally, no bulging or erythema  Cardio: non-tachycardic; skin warm and well perfused  Resp: normal respiratory effort; no accessory muscle use  GI: no active vomiting  Neuro: strength 5/5 in upper and lower extremities; sensation intact to light touch in all dermatomes; CN II-XII intact; normal gait; no dysmetria; no disdiadochokinesia  Skin: No evidence of rash or bruising  MSK: normal movement of all extremities  Lymph/Vasc: no LE edema

## 2024-09-13 NOTE — ED PROVIDER NOTE - NSFOLLOWUPINSTRUCTIONS_ED_ALL_ED_FT
You were seen in our emergency department for dizziness.    Continue taking your meclizine as prescribed by your primary medical doctor. You should follow up with him or her to continue medical management.    Consider visiting a neurologist if you have persistent dizziness. Information for a nearby clinic is included in this paperwork.    Return to the emergency department if you have chest pain, shortness of breath, slurred speech, inability to walk. You were seen in our emergency department for dizziness.    Continue taking your meclizine as prescribed by your primary medical doctor. You should follow up with him or her to continue medical management.    Consider visiting a neurologist if you have persistent dizziness. Information for a nearby clinic is included in this paperwork.    Return to the emergency department if you have chest pain, shortness of breath, slurred speech, inability to walk.    1. TAKE ALL MEDICATIONS AS DIRECTED.    2. FOR PAIN OR FEVER YOU CAN TAKE IBUPROFEN (MOTRIN, ADVIL) 600mg every 6 hours OR ACETAMINOPHEN (TYLENOL) 975mg every 6 hours AS NEEDED, AS DIRECTED ON PACKAGING.  3. FOLLOW UP WITH YOUR PRIMARY DOCTOR WITHIN 5 DAYS AS DIRECTED.  4. IF YOU HAD LABS OR IMAGING DONE, YOU WERE GIVEN COPIES OF ALL LABS AND/OR IMAGING RESULTS FROM YOUR ER VISIT--PLEASE TAKE THEM WITH YOU TO YOUR FOLLOW UP APPOINTMENTS.   To obtain a copy of your medical records or disc, please contact medical records during the hours of operation (Monday - Friday 8am - 7pm; Saturday 8am - 4pm) at Phone: [phone]   5. RETURN TO THE ER FOR ANY WORSENING SYMPTOMS OR CONCERNS.     Dizziness    Dizziness can manifest as a feeling of unsteadiness or light-headedness. You may feel like you are about to faint. This condition can be caused by a number of things, including medicines, dehydration, or illness. Drink enough fluid to keep your urine clear or pale yellow. Do not drink alcohol and limit your caffeine intake. Avoid quick or sudden movements.  Rise slowly from chairs and steady yourself until you feel okay. In the morning, first sit up on the side of the bed.    SEEK IMMEDIATE MEDICAL CARE IF YOU HAVE ANY OF THE FOLLOWING SYMPTOMS: vomiting, changes in your vision or speech, weakness in your arms or legs, trouble speaking or swallowing, chest pain, abdominal pain, shortness of breath, sweating, bleeding, headache, neck pain, or fever.

## 2024-09-13 NOTE — ED PROVIDER NOTE - CLINICAL SUMMARY MEDICAL DECISION MAKING FREE TEXT BOX
Patient with dizziness improving with meclizine. Exam not significant for central causes, ear exam normal. Will check basic labs, EKG, anticipate patient discharge. Patient with dizziness improving with meclizine. Exam not significant for central causes, ear exam normal. Will check basic labs, EKG, anticipate patient discharge.    pettet attending- see attending attestation for my mdm

## 2024-09-13 NOTE — ED ADULT NURSE NOTE - NSFALLUNIVINTERV_ED_ALL_ED
Bed/Stretcher in lowest position, wheels locked, appropriate side rails in place/Call bell, personal items and telephone in reach/Instruct patient to call for assistance before getting out of bed/chair/stretcher/Non-slip footwear applied when patient is off stretcher/[place] to call system/Physically safe environment - no spills, clutter or unnecessary equipment/Purposeful proactive rounding/Room/bathroom lighting operational, light cord in reach

## 2024-09-13 NOTE — ED PROVIDER NOTE - OBJECTIVE STATEMENT
69M with past medical history hypertension, dyslipidemia, diabetes mellitus, presents to the emergency department with dizziness x 5d. He saw his primary medical doctor who prescribed meclizine 25mg which is improving symptoms. He still has a little gait instability when walking. He also reports he turned over in bed last night and felt a "shock" sensation lasting a few seconds on left side of head. No symptoms since that time. He has no vision changes, nausea, vomiting.

## 2024-09-13 NOTE — ED PROVIDER NOTE - PATIENT PORTAL LINK FT
You can access the FollowMyHealth Patient Portal offered by [facility] by registering at the following website: [email or web address]. By joining [employer]’s FollowMyHealth portal, you will also be able to view your health information using other applications (apps) compatible with our system.

## 2024-09-13 NOTE — ED ADULT NURSE NOTE - OBJECTIVE STATEMENT
68 yo Male with PMH vertigo, HTN, HLD presents to the ED with report of dizziness x5 days. Pt reports waking up tonight and turning to the right when he began to feel dizzy leading him here. Pt reports taking meclizine for vertigo but PMD increasing dose recently. Upon assessment, pt is a&ox4, PERRL, speaking in full coherent sentences, denies chest pain or SOB, abd soft and nondistended upon palpation, pt moving upper and lower extremities without difficulty. Pt endorsing L eye pain without associated blurry vision or visual changes. Pt denies recent sick or ill contacts. Bed locked in lowest position, safety and comfort measures maintained.

## 2025-09-07 ENCOUNTER — NON-APPOINTMENT (OUTPATIENT)
Age: 70
End: 2025-09-07